# Patient Record
Sex: FEMALE | Race: WHITE | NOT HISPANIC OR LATINO
[De-identification: names, ages, dates, MRNs, and addresses within clinical notes are randomized per-mention and may not be internally consistent; named-entity substitution may affect disease eponyms.]

---

## 2017-09-29 ENCOUNTER — RECORD ABSTRACTING (OUTPATIENT)
Age: 47
End: 2017-09-29

## 2017-10-03 ENCOUNTER — NON-APPOINTMENT (OUTPATIENT)
Age: 47
End: 2017-10-03

## 2017-10-03 ENCOUNTER — APPOINTMENT (OUTPATIENT)
Dept: FAMILY MEDICINE | Facility: CLINIC | Age: 47
End: 2017-10-03
Payer: COMMERCIAL

## 2017-10-03 VITALS
HEIGHT: 67 IN | SYSTOLIC BLOOD PRESSURE: 96 MMHG | BODY MASS INDEX: 21.19 KG/M2 | OXYGEN SATURATION: 91 % | RESPIRATION RATE: 14 BRPM | HEART RATE: 81 BPM | WEIGHT: 135 LBS | DIASTOLIC BLOOD PRESSURE: 64 MMHG

## 2017-10-03 DIAGNOSIS — Z80.0 FAMILY HISTORY OF MALIGNANT NEOPLASM OF DIGESTIVE ORGANS: ICD-10-CM

## 2017-10-03 DIAGNOSIS — Z87.09 PERSONAL HISTORY OF OTHER DISEASES OF THE RESPIRATORY SYSTEM: ICD-10-CM

## 2017-10-03 DIAGNOSIS — R93.2 ABNORMAL FINDINGS ON DIAGNOSTIC IMAGING OF LIVER AND BILIARY TRACT: ICD-10-CM

## 2017-10-03 LAB
BILIRUB UR QL STRIP: NORMAL
GLUCOSE UR-MCNC: NORMAL
HCG UR QL: 0.2 EU/DL
HGB UR QL STRIP.AUTO: NORMAL
KETONES UR-MCNC: NORMAL
LEUKOCYTE ESTERASE UR QL STRIP: NORMAL
NITRITE UR QL STRIP: NORMAL
PH UR STRIP: 5
PROT UR STRIP-MCNC: NORMAL
SP GR UR STRIP: 1

## 2017-10-03 PROCEDURE — 99396 PREV VISIT EST AGE 40-64: CPT | Mod: 25

## 2017-10-03 PROCEDURE — G0008: CPT

## 2017-10-03 PROCEDURE — 81002 URINALYSIS NONAUTO W/O SCOPE: CPT

## 2017-10-03 PROCEDURE — 93000 ELECTROCARDIOGRAM COMPLETE: CPT

## 2017-10-03 PROCEDURE — 90686 IIV4 VACC NO PRSV 0.5 ML IM: CPT

## 2017-10-03 RX ORDER — ALPRAZOLAM 0.5 MG/1
0.5 TABLET ORAL 4 TIMES DAILY
Refills: 0 | Status: DISCONTINUED | COMMUNITY
End: 2017-10-03

## 2017-10-08 LAB
ALBUMIN SERPL ELPH-MCNC: 4.5 G/DL
ALP BLD-CCNC: 42 U/L
ALT SERPL-CCNC: 11 U/L
ANION GAP SERPL CALC-SCNC: 14 MMOL/L
AST SERPL-CCNC: 21 U/L
BASOPHILS # BLD AUTO: 0.03 K/UL
BASOPHILS NFR BLD AUTO: 0.6 %
BILIRUB SERPL-MCNC: 0.4 MG/DL
BUN SERPL-MCNC: 15 MG/DL
CALCIUM SERPL-MCNC: 9.5 MG/DL
CHLORIDE SERPL-SCNC: 101 MMOL/L
CHOLEST SERPL-MCNC: 178 MG/DL
CHOLEST/HDLC SERPL: 2.3 RATIO
CO2 SERPL-SCNC: 26 MMOL/L
CREAT SERPL-MCNC: 0.78 MG/DL
EOSINOPHIL # BLD AUTO: 0.13 K/UL
EOSINOPHIL NFR BLD AUTO: 2.8 %
ESTIMATED AVERAGE GLUCOSE: 111 MG/DL
ESTROGEN SERPL-MCNC: 242 PG/ML
FSH SERPL-MCNC: 55.1 IU/L
GLUCOSE SERPL-MCNC: 91 MG/DL
HBA1C MFR BLD HPLC: 5.5 %
HCT VFR BLD CALC: 39.1 %
HDLC SERPL-MCNC: 76 MG/DL
HGB BLD-MCNC: 13.1 G/DL
IMM GRANULOCYTES NFR BLD AUTO: 0.9 %
IRON SERPL-MCNC: 166 UG/DL
LDLC SERPL CALC-MCNC: 90 MG/DL
LH SERPL-ACNC: 37.7 IU/L
LYMPHOCYTES # BLD AUTO: 2.16 K/UL
LYMPHOCYTES NFR BLD AUTO: 46.1 %
MAN DIFF?: NORMAL
MCHC RBC-ENTMCNC: 31.6 PG
MCHC RBC-ENTMCNC: 33.5 GM/DL
MCV RBC AUTO: 94.2 FL
MONOCYTES # BLD AUTO: 0.56 K/UL
MONOCYTES NFR BLD AUTO: 11.9 %
NEUTROPHILS # BLD AUTO: 1.77 K/UL
NEUTROPHILS NFR BLD AUTO: 37.7 %
PLATELET # BLD AUTO: 257 K/UL
POTASSIUM SERPL-SCNC: 4.6 MMOL/L
PROT SERPL-MCNC: 7.5 G/DL
RBC # BLD: 4.15 M/UL
RBC # FLD: 12.6 %
SODIUM SERPL-SCNC: 141 MMOL/L
TRIGL SERPL-MCNC: 62 MG/DL
TSH SERPL-ACNC: 1.6 UIU/ML
WBC # FLD AUTO: 4.69 K/UL

## 2017-12-01 ENCOUNTER — LABORATORY RESULT (OUTPATIENT)
Age: 47
End: 2017-12-01

## 2018-07-22 PROBLEM — Z80.0 FAMILY HISTORY OF COLON CANCER: Status: ACTIVE | Noted: 2017-10-03

## 2018-10-26 ENCOUNTER — APPOINTMENT (OUTPATIENT)
Dept: FAMILY MEDICINE | Facility: CLINIC | Age: 48
End: 2018-10-26
Payer: COMMERCIAL

## 2018-10-26 ENCOUNTER — NON-APPOINTMENT (OUTPATIENT)
Age: 48
End: 2018-10-26

## 2018-10-26 ENCOUNTER — LABORATORY RESULT (OUTPATIENT)
Age: 48
End: 2018-10-26

## 2018-10-26 VITALS
SYSTOLIC BLOOD PRESSURE: 100 MMHG | OXYGEN SATURATION: 98 % | HEIGHT: 67 IN | BODY MASS INDEX: 21.19 KG/M2 | DIASTOLIC BLOOD PRESSURE: 60 MMHG | HEART RATE: 78 BPM | WEIGHT: 135 LBS | RESPIRATION RATE: 14 BRPM

## 2018-10-26 DIAGNOSIS — Z12.31 ENCOUNTER FOR SCREENING MAMMOGRAM FOR MALIGNANT NEOPLASM OF BREAST: ICD-10-CM

## 2018-10-26 DIAGNOSIS — D72.829 ELEVATED WHITE BLOOD CELL COUNT, UNSPECIFIED: ICD-10-CM

## 2018-10-26 LAB
BILIRUB UR QL STRIP: NORMAL
GLUCOSE UR-MCNC: NORMAL
HCG UR QL: 0.2 EU/DL
HGB UR QL STRIP.AUTO: NORMAL
KETONES UR-MCNC: NORMAL
LEUKOCYTE ESTERASE UR QL STRIP: NORMAL
NITRITE UR QL STRIP: NORMAL
PH UR STRIP: 7
PROT UR STRIP-MCNC: NORMAL
SP GR UR STRIP: 1.02

## 2018-10-26 PROCEDURE — 93000 ELECTROCARDIOGRAM COMPLETE: CPT

## 2018-10-26 PROCEDURE — G0008: CPT

## 2018-10-26 PROCEDURE — 36415 COLL VENOUS BLD VENIPUNCTURE: CPT

## 2018-10-26 PROCEDURE — 90686 IIV4 VACC NO PRSV 0.5 ML IM: CPT

## 2018-10-26 PROCEDURE — 99396 PREV VISIT EST AGE 40-64: CPT | Mod: 25

## 2018-10-26 PROCEDURE — 81003 URINALYSIS AUTO W/O SCOPE: CPT | Mod: NC,QW

## 2018-10-26 NOTE — ASSESSMENT
[FreeTextEntry1] : Basic cardiovascular prevention measures are advised including regular exercise, surveillance medical examination, and prudent portion-controlled low fat diet, rich in a variety of vegetables with minimal added sugars, refined starches, and no artificially hydrogenated oils.\par \par  cont medications- lexapro renew xanax.  As per usual protocol the patient was advised in regards to the risks of driving when on medications with side effects of dizziness or drowsiness. Patient has been assessed  for increase risk for respiratory depression. Istop checked.\par \par obtain mammogram obtain colonoscopy\par \par  Information regarding flu shot reviewed. All questions answered.Flu shot .5 cc IM    left  deltoid . No reaction noted. Advised patients to wash hands to reduce the spread of infection.\par \par Labs drawn/ specimens obtained  in office on this date of service  for evaluation of   assessed conditions -  screening diabetes, cholesterol thyroid iron    to be run at [facility].\par

## 2018-10-26 NOTE — HEALTH RISK ASSESSMENT
[No falls in past year] : Patient reported no falls in the past year [0] : 2) Feeling down, depressed, or hopeless: Not at all (0) [Patient declined bone density test] : Patient declined bone density test [HIV test declined] : HIV test declined [Hepatitis C test declined] : Hepatitis C test declined [None] : None [With Family] : lives with family [] :  [# Of Children ___] : has [unfilled] children [Sexually Active] : sexually active [Feels Safe at Home] : Feels safe at home [Fully functional (bathing, dressing, toileting, transferring, walking, feeding)] : Fully functional (bathing, dressing, toileting, transferring, walking, feeding) [Fully functional (using the telephone, shopping, preparing meals, housekeeping, doing laundry, using] : Fully functional and needs no help or supervision to perform IADLs (using the telephone, shopping, preparing meals, housekeeping, doing laundry, using transportation, managing medications and managing finances) [Smoke Detector] : smoke detector [Carbon Monoxide Detector] : carbon monoxide detector [Safety elements used in home] : safety elements used in home [Seat Belt] :  uses seat belt [Sunscreen] : uses sunscreen [Discussed at today's visit] : Advance Directives Discussed at today's visit [Designated Healthcare Proxy] : Designated healthcare proxy [Relationship: ___] : Relationship: [unfilled] [Aggressive treatment] : aggressive treatment [Excellent] : ~his/her~  mood as  excellent [Patient reported colonoscopy was normal] : Patient reported colonoscopy was normal [# of Members in Household ___] :  household currently consist of [unfilled] member(s) [Employed] : employed [High School] : high school [Reports changes in hearing] : Reports changes in hearing [Name: ___] : Health Care Proxy's Name: [unfilled]  [FreeTextEntry1] : physical [] : No [de-identified] : no [de-identified] : gyn Dr Roman [Change in mental status noted] : No change in mental status noted [Language] : denies difficulty with language [Behavior] : denies difficulty with behavior [Learning/Retaining New Information] : denies difficulty learning/retaining new information [Handling Complex Tasks] : denies difficulty handling complex tasks [Reasoning] : denies difficulty with reasoning [Spatial Ability and Orientation] : denies difficulty with spatial ability and orientation [Reports changes in vision] : Reports no changes in vision [Reports changes in dental health] : Reports no changes in dental health [Guns at Home] : no guns at home [Travel to Developing Areas] : does not  travel to developing areas [MammogramDate] : 2/18 [ColonoscopyDate] : 2015

## 2018-10-26 NOTE — HISTORY OF PRESENT ILLNESS
[FreeTextEntry1] : patient presents for physical  [de-identified] : 48 wf here for physical patient weaned herself off xanax and takes it very sparingly. she is feeling good on the lexapro. She doesn’t have any palpitations. The meds keep her even.

## 2018-10-29 LAB
24R-OH-CALCIDIOL SERPL-MCNC: 74.1 PG/ML
ALBUMIN SERPL ELPH-MCNC: 4.3 G/DL
ALP BLD-CCNC: 45 U/L
ALT SERPL-CCNC: 14 U/L
ANION GAP SERPL CALC-SCNC: 11 MMOL/L
AST SERPL-CCNC: 21 U/L
BACTERIA UR CULT: NORMAL
BASOPHILS # BLD AUTO: 0.04 K/UL
BASOPHILS NFR BLD AUTO: 0.6 %
BILIRUB SERPL-MCNC: 0.2 MG/DL
BUN SERPL-MCNC: 11 MG/DL
CALCIUM SERPL-MCNC: 8.5 MG/DL
CHLORIDE SERPL-SCNC: 104 MMOL/L
CHOLEST SERPL-MCNC: 173 MG/DL
CHOLEST/HDLC SERPL: 2.4 RATIO
CO2 SERPL-SCNC: 26 MMOL/L
CREAT SERPL-MCNC: 0.61 MG/DL
EOSINOPHIL # BLD AUTO: 0.1 K/UL
EOSINOPHIL NFR BLD AUTO: 1.5 %
FOLATE SERPL-MCNC: 9.5 NG/ML
GLUCOSE SERPL-MCNC: 86 MG/DL
HBA1C MFR BLD HPLC: 5.5 %
HCT VFR BLD CALC: 36.6 %
HDLC SERPL-MCNC: 71 MG/DL
HGB BLD-MCNC: 12.4 G/DL
IMM GRANULOCYTES NFR BLD AUTO: 0 %
IRON SERPL-MCNC: 88 UG/DL
LDLC SERPL CALC-MCNC: 78 MG/DL
LYMPHOCYTES # BLD AUTO: 2.08 K/UL
LYMPHOCYTES NFR BLD AUTO: 31.6 %
MAGNESIUM SERPL-MCNC: 2 MG/DL
MAN DIFF?: NORMAL
MCHC RBC-ENTMCNC: 31.4 PG
MCHC RBC-ENTMCNC: 33.9 GM/DL
MCV RBC AUTO: 92.7 FL
MONOCYTES # BLD AUTO: 0.69 K/UL
MONOCYTES NFR BLD AUTO: 10.5 %
NEUTROPHILS # BLD AUTO: 3.67 K/UL
NEUTROPHILS NFR BLD AUTO: 55.8 %
PLATELET # BLD AUTO: 325 K/UL
POTASSIUM SERPL-SCNC: 4.2 MMOL/L
PROT SERPL-MCNC: 6.9 G/DL
RBC # BLD: 3.95 M/UL
RBC # FLD: 13.1 %
SODIUM SERPL-SCNC: 141 MMOL/L
T3 SERPL-MCNC: 101 NG/DL
T3FREE SERPL-MCNC: 2.44 PG/ML
T3RU NFR SERPL: 1.1 INDEX
T4 FREE SERPL-MCNC: 1.3 NG/DL
TRIGL SERPL-MCNC: 119 MG/DL
TSH SERPL-ACNC: 0.9 UIU/ML
VIT B12 SERPL-MCNC: 954 PG/ML
WBC # FLD AUTO: 6.58 K/UL

## 2018-11-13 LAB — HEMOCCULT STL QL IA: NEGATIVE

## 2018-12-03 ENCOUNTER — MEDICATION RENEWAL (OUTPATIENT)
Age: 48
End: 2018-12-03

## 2018-12-03 DIAGNOSIS — B00.1 HERPESVIRAL VESICULAR DERMATITIS: ICD-10-CM

## 2019-01-21 ENCOUNTER — RESULT REVIEW (OUTPATIENT)
Age: 49
End: 2019-01-21

## 2019-08-01 ENCOUNTER — RX RENEWAL (OUTPATIENT)
Age: 49
End: 2019-08-01

## 2019-11-01 ENCOUNTER — APPOINTMENT (OUTPATIENT)
Dept: FAMILY MEDICINE | Facility: CLINIC | Age: 49
End: 2019-11-01
Payer: COMMERCIAL

## 2019-11-01 ENCOUNTER — NON-APPOINTMENT (OUTPATIENT)
Age: 49
End: 2019-11-01

## 2019-11-01 ENCOUNTER — LABORATORY RESULT (OUTPATIENT)
Age: 49
End: 2019-11-01

## 2019-11-01 VITALS
WEIGHT: 150 LBS | SYSTOLIC BLOOD PRESSURE: 90 MMHG | RESPIRATION RATE: 14 BRPM | OXYGEN SATURATION: 99 % | HEART RATE: 70 BPM | BODY MASS INDEX: 23.54 KG/M2 | DIASTOLIC BLOOD PRESSURE: 70 MMHG | HEIGHT: 67 IN

## 2019-11-01 DIAGNOSIS — H91.91 UNSPECIFIED HEARING LOSS, RIGHT EAR: ICD-10-CM

## 2019-11-01 DIAGNOSIS — R94.31 ABNORMAL ELECTROCARDIOGRAM [ECG] [EKG]: ICD-10-CM

## 2019-11-01 LAB
BILIRUB UR QL STRIP: NEGATIVE
CLARITY UR: CLEAR
COLLECTION METHOD: NORMAL
CYTOLOGY CVX/VAG DOC THIN PREP: NORMAL
GLUCOSE UR-MCNC: NEGATIVE
HCG UR QL: 0.2 EU/DL
HGB UR QL STRIP.AUTO: NORMAL
KETONES UR-MCNC: NEGATIVE
LEUKOCYTE ESTERASE UR QL STRIP: NORMAL
NITRITE UR QL STRIP: NEGATIVE
PH UR STRIP: 6
PROT UR STRIP-MCNC: NEGATIVE
SP GR UR STRIP: 1.02

## 2019-11-01 PROCEDURE — G0008: CPT

## 2019-11-01 PROCEDURE — 90674 CCIIV4 VAC NO PRSV 0.5 ML IM: CPT

## 2019-11-01 PROCEDURE — 99396 PREV VISIT EST AGE 40-64: CPT | Mod: 25

## 2019-11-01 PROCEDURE — 81003 URINALYSIS AUTO W/O SCOPE: CPT | Mod: NC,QW

## 2019-11-01 PROCEDURE — 36415 COLL VENOUS BLD VENIPUNCTURE: CPT

## 2019-11-01 PROCEDURE — 93000 ELECTROCARDIOGRAM COMPLETE: CPT

## 2019-11-01 NOTE — PHYSICAL EXAM
[No Acute Distress] : no acute distress [Well Nourished] : well nourished [Well Developed] : well developed [Well-Appearing] : well-appearing [Normal Sclera/Conjunctiva] : normal sclera/conjunctiva [PERRL] : pupils equal round and reactive to light [EOMI] : extraocular movements intact [Normal Outer Ear/Nose] : the outer ears and nose were normal in appearance [Normal Oropharynx] : the oropharynx was normal [No JVD] : no jugular venous distention [No Lymphadenopathy] : no lymphadenopathy [Supple] : supple [Thyroid Normal, No Nodules] : the thyroid was normal and there were no nodules present [No Respiratory Distress] : no respiratory distress  [No Accessory Muscle Use] : no accessory muscle use [Clear to Auscultation] : lungs were clear to auscultation bilaterally [Normal Rate] : normal rate  [Regular Rhythm] : with a regular rhythm [Normal S1, S2] : normal S1 and S2 [No Murmur] : no murmur heard [No Carotid Bruits] : no carotid bruits [No Abdominal Bruit] : a ~M bruit was not heard ~T in the abdomen [No Varicosities] : no varicosities [Pedal Pulses Present] : the pedal pulses are present [No Edema] : there was no peripheral edema [No Palpable Aorta] : no palpable aorta [No Extremity Clubbing/Cyanosis] : no extremity clubbing/cyanosis [Soft] : abdomen soft [Non Tender] : non-tender [Non-distended] : non-distended [No Masses] : no abdominal mass palpated [No HSM] : no HSM [Normal Bowel Sounds] : normal bowel sounds [Normal Posterior Cervical Nodes] : no posterior cervical lymphadenopathy [Normal Anterior Cervical Nodes] : no anterior cervical lymphadenopathy [No CVA Tenderness] : no CVA  tenderness [No Spinal Tenderness] : no spinal tenderness [No Joint Swelling] : no joint swelling [Grossly Normal Strength/Tone] : grossly normal strength/tone [Coordination Grossly Intact] : coordination grossly intact [No Focal Deficits] : no focal deficits [Normal Gait] : normal gait [Deep Tendon Reflexes (DTR)] : deep tendon reflexes were 2+ and symmetric [Normal Affect] : the affect was normal [Normal Insight/Judgement] : insight and judgment were intact [Normal] : affect was normal and insight and judgment were intact [de-identified] : dark scars on legs from mosquito bites

## 2019-11-01 NOTE — ASSESSMENT
[FreeTextEntry1] : Basic cardiovascular prevention measures are advised including regular exercise, surveillance medical examination, and prudent portion-controlled low fat diet, rich in a variety of vegetables with minimal added sugars, refined starches, and no artificially hydrogenated oils.\par Labs drawn/ specimens obtained  in office on this date of service  for evaluation of   assessed conditions -  cbc cmp lipid    to be run at [facility].\par  Information regarding flu shot reviewed. All questions answered.Flu shot .5 cc IM  left    deltoid . No reaction noted. Advised patients to wash hands to reduce the spread of infection.\par echocardiogram to confirm ekg abnormality.  ( Ekg  no changes\par colonoscopy at 50 \par follow up dermatology\par renew meds.\par  As per usual protocol the patient was advised in regards to the risks of driving when on medications with side effects of dizziness or drowsiness. Patient has been assessed  for increase risk for respiratory depression. Patient denies suicidal ideations or plan.  Istop checked.\par  We spent sufficient time to discuss aspects of care; questions were answered  to patient's satisfaction.The diagnosis and care plan were discussed with patient in detail.  Patient test results were  reviewed and explained in full. All questions and concerns  were answered to the best of my knowledge.\par \par

## 2019-11-01 NOTE — REVIEW OF SYSTEMS
[Hearing Loss] : hearing loss [Fatigue] : no fatigue [Negative] : Psychiatric [FreeTextEntry4] : right  [de-identified] : scars from mosquito bites

## 2019-11-01 NOTE — HEALTH RISK ASSESSMENT
[No] : In the past 12 months have you used drugs other than those required for medical reasons? No [Patient declined Low Dose CT Scan] : Patient declined Low Dose CT Scan [Patient reported mammogram was normal] : Patient reported mammogram was normal [Patient declined bone density test] : Patient declined bone density test [Patient reported colonoscopy was normal] : Patient reported colonoscopy was normal [HIV test declined] : HIV test declined [Hepatitis C test declined] : Hepatitis C test declined [None] : None [With Family] : lives with family [# of Members in Household ___] :  household currently consist of [unfilled] member(s) [Employed] : employed [High School] : high school [] :  [# Of Children ___] : has [unfilled] children [Sexually Active] : sexually active [Feels Safe at Home] : Feels safe at home [Fully functional (bathing, dressing, toileting, transferring, walking, feeding)] : Fully functional (bathing, dressing, toileting, transferring, walking, feeding) [Fully functional (using the telephone, shopping, preparing meals, housekeeping, doing laundry, using] : Fully functional and needs no help or supervision to perform IADLs (using the telephone, shopping, preparing meals, housekeeping, doing laundry, using transportation, managing medications and managing finances) [Reports normal functional visual acuity (ie: able to read med bottle)] : Reports normal functional visual acuity [Smoke Detector] : smoke detector [Carbon Monoxide Detector] : carbon monoxide detector [Safety elements used in home] : safety elements used in home [Seat Belt] :  uses seat belt [Sunscreen] : uses sunscreen [Reviewed updated] : Reviewed updated [Designated Healthcare Proxy] : Designated healthcare proxy [Name: ___] : Health Care Proxy's Name: [unfilled]  [Aggressive treatment] : aggressive treatment [Excellent] : ~his/her~ current health as excellent [Very Good] : ~his/her~  mood as very good [No falls in past year] : Patient reported no falls in the past year [No Retinopathy] : No retinopathy [Reports changes in hearing] : Reports changes in hearing [FreeTextEntry1] : physical [] : No [de-identified] : no [de-identified] : gyn  hearing specialist sachin delaney [Audit-CScore] : 0 [de-identified] : walk [de-identified] : good [EyeExamDate] : 3/19 [Change in mental status noted] : No change in mental status noted [Language] : denies difficulty with language [Behavior] : denies difficulty with behavior [Learning/Retaining New Information] : denies difficulty learning/retaining new information [Handling Complex Tasks] : denies difficulty handling complex tasks [Reasoning] : denies difficulty with reasoning [Spatial Ability and Orientation] : denies difficulty with spatial ability and orientation [Reports changes in vision] : Reports no changes in vision [Reports changes in dental health] : Reports no changes in dental health [Guns at Home] : no guns at home [Travel to Developing Areas] : does not  travel to developing areas [TB Exposure] : is not being exposed to tuberculosis [Caregiver Concerns] : does not have caregiver concerns [MammogramDate] : 5/18 [PapSmearDate] : 1/18 [PapSmearComments] : no menses since 12/18 [ColonoscopyDate] : 10/22/15 [de-identified] : right ear borderline needs a hearing aid. [AdvancecareDate] : 11/1/19

## 2019-11-04 LAB
ALBUMIN SERPL ELPH-MCNC: 4.4 G/DL
ALP BLD-CCNC: 57 U/L
ALT SERPL-CCNC: 12 U/L
ANION GAP SERPL CALC-SCNC: 10 MMOL/L
AST SERPL-CCNC: 16 U/L
BASOPHILS # BLD AUTO: 0.05 K/UL
BASOPHILS NFR BLD AUTO: 0.8 %
BILIRUB SERPL-MCNC: 0.4 MG/DL
BUN SERPL-MCNC: 15 MG/DL
CALCIUM SERPL-MCNC: 9.6 MG/DL
CHLORIDE SERPL-SCNC: 103 MMOL/L
CHOLEST SERPL-MCNC: 177 MG/DL
CHOLEST/HDLC SERPL: 2.5 RATIO
CO2 SERPL-SCNC: 27 MMOL/L
CREAT SERPL-MCNC: 0.7 MG/DL
EOSINOPHIL # BLD AUTO: 0.22 K/UL
EOSINOPHIL NFR BLD AUTO: 3.7 %
ESTIMATED AVERAGE GLUCOSE: 114 MG/DL
FERRITIN SERPL-MCNC: 72 NG/ML
FOLATE SERPL-MCNC: 14.8 NG/ML
GLUCOSE SERPL-MCNC: 94 MG/DL
HBA1C MFR BLD HPLC: 5.6 %
HCT VFR BLD CALC: 38 %
HDLC SERPL-MCNC: 70 MG/DL
HGB BLD-MCNC: 12.5 G/DL
IMM GRANULOCYTES NFR BLD AUTO: 0.2 %
IRON SATN MFR SERPL: 52 %
IRON SERPL-MCNC: 144 UG/DL
LDLC SERPL CALC-MCNC: 91 MG/DL
LYMPHOCYTES # BLD AUTO: 2.58 K/UL
LYMPHOCYTES NFR BLD AUTO: 43.7 %
MAGNESIUM SERPL-MCNC: 1.8 MG/DL
MAN DIFF?: NORMAL
MCHC RBC-ENTMCNC: 31.3 PG
MCHC RBC-ENTMCNC: 32.9 GM/DL
MCV RBC AUTO: 95 FL
MONOCYTES # BLD AUTO: 0.63 K/UL
MONOCYTES NFR BLD AUTO: 10.7 %
NEUTROPHILS # BLD AUTO: 2.42 K/UL
NEUTROPHILS NFR BLD AUTO: 40.9 %
PLATELET # BLD AUTO: 284 K/UL
POTASSIUM SERPL-SCNC: 4 MMOL/L
PROT SERPL-MCNC: 6.6 G/DL
RBC # BLD: 4 M/UL
RBC # FLD: 12.2 %
SODIUM SERPL-SCNC: 140 MMOL/L
T3 SERPL-MCNC: 120 NG/DL
T3FREE SERPL-MCNC: 3.7 PG/ML
T3RU NFR SERPL: 1 TBI
T4 FREE SERPL-MCNC: 1.5 NG/DL
TIBC SERPL-MCNC: 278 UG/DL
TRIGL SERPL-MCNC: 79 MG/DL
TSH SERPL-ACNC: 1.33 UIU/ML
UIBC SERPL-MCNC: 134 UG/DL
VIT B12 SERPL-MCNC: 860 PG/ML
WBC # FLD AUTO: 5.91 K/UL

## 2020-11-06 ENCOUNTER — APPOINTMENT (OUTPATIENT)
Dept: FAMILY MEDICINE | Facility: CLINIC | Age: 50
End: 2020-11-06
Payer: COMMERCIAL

## 2020-11-06 ENCOUNTER — NON-APPOINTMENT (OUTPATIENT)
Age: 50
End: 2020-11-06

## 2020-11-06 VITALS
BODY MASS INDEX: 23.54 KG/M2 | HEIGHT: 67 IN | SYSTOLIC BLOOD PRESSURE: 110 MMHG | WEIGHT: 150 LBS | OXYGEN SATURATION: 98 % | TEMPERATURE: 97.6 F | DIASTOLIC BLOOD PRESSURE: 80 MMHG | RESPIRATION RATE: 14 BRPM | HEART RATE: 74 BPM

## 2020-11-06 DIAGNOSIS — R20.2 PARESTHESIA OF SKIN: ICD-10-CM

## 2020-11-06 LAB
BILIRUB UR QL STRIP: NEGATIVE
GLUCOSE UR-MCNC: NEGATIVE
HCG UR QL: 0.2 EU/DL
HGB UR QL STRIP.AUTO: NORMAL
KETONES UR-MCNC: NEGATIVE
LEUKOCYTE ESTERASE UR QL STRIP: NORMAL
NITRITE UR QL STRIP: NEGATIVE
PH UR STRIP: 6.5
PROT UR STRIP-MCNC: NEGATIVE
SP GR UR STRIP: 1.02

## 2020-11-06 PROCEDURE — 36415 COLL VENOUS BLD VENIPUNCTURE: CPT

## 2020-11-06 PROCEDURE — 93000 ELECTROCARDIOGRAM COMPLETE: CPT

## 2020-11-06 PROCEDURE — G0008: CPT

## 2020-11-06 PROCEDURE — 99072 ADDL SUPL MATRL&STAF TM PHE: CPT

## 2020-11-06 PROCEDURE — 81003 URINALYSIS AUTO W/O SCOPE: CPT | Mod: QW

## 2020-11-06 PROCEDURE — 90686 IIV4 VACC NO PRSV 0.5 ML IM: CPT

## 2020-11-06 PROCEDURE — 99396 PREV VISIT EST AGE 40-64: CPT | Mod: 25

## 2020-11-06 RX ORDER — VALACYCLOVIR 500 MG/1
500 TABLET, FILM COATED ORAL
Qty: 90 | Refills: 3 | Status: ACTIVE | COMMUNITY
Start: 2018-12-03 | End: 1900-01-01

## 2020-11-06 NOTE — HEALTH RISK ASSESSMENT
[No] : In the past 12 months have you used drugs other than those required for medical reasons? No [No falls in past year] : Patient reported no falls in the past year [Patient declined Low Dose CT Scan] : Patient declined Low Dose CT Scan [No Retinopathy] : No retinopathy [Patient reported mammogram was normal] : Patient reported mammogram was normal [Patient declined bone density test] : Patient declined bone density test [Patient reported colonoscopy was normal] : Patient reported colonoscopy was normal [HIV test declined] : HIV test declined [Hepatitis C test declined] : Hepatitis C test declined [None] : None [With Family] : lives with family [# of Members in Household ___] :  household currently consist of [unfilled] member(s) [Employed] : employed [High School] : high school [] :  [# Of Children ___] : has [unfilled] children [Sexually Active] : sexually active [Feels Safe at Home] : Feels safe at home [Fully functional (bathing, dressing, toileting, transferring, walking, feeding)] : Fully functional (bathing, dressing, toileting, transferring, walking, feeding) [Fully functional (using the telephone, shopping, preparing meals, housekeeping, doing laundry, using] : Fully functional and needs no help or supervision to perform IADLs (using the telephone, shopping, preparing meals, housekeeping, doing laundry, using transportation, managing medications and managing finances) [Reports changes in hearing] : Reports changes in hearing [Reports normal functional visual acuity (ie: able to read med bottle)] : Reports normal functional visual acuity [Smoke Detector] : smoke detector [Carbon Monoxide Detector] : carbon monoxide detector [Safety elements used in home] : safety elements used in home [Seat Belt] :  uses seat belt [Sunscreen] : uses sunscreen [Reviewed updated] : Reviewed updated [Designated Healthcare Proxy] : Designated healthcare proxy [Name: ___] : Health Care Proxy's Name: [unfilled]  [Aggressive treatment] : aggressive treatment [Good] : ~his/her~ current health as good [Fair] :  ~his/her~ mood as fair [FreeTextEntry1] : physical [] : No [de-identified] : no [de-identified] : gyn  hearing specialist sachin delaney [Audit-CScore] : 0 [de-identified] : no [de-identified] : no [EyeExamDate] : 3/19 [Change in mental status noted] : No change in mental status noted [Language] : denies difficulty with language [Behavior] : denies difficulty with behavior [Learning/Retaining New Information] : denies difficulty learning/retaining new information [Handling Complex Tasks] : denies difficulty handling complex tasks [Reasoning] : denies difficulty with reasoning [Spatial Ability and Orientation] : denies difficulty with spatial ability and orientation [Reports changes in vision] : Reports no changes in vision [Reports changes in dental health] : Reports no changes in dental health [Guns at Home] : no guns at home [Travel to Developing Areas] : does not  travel to developing areas [TB Exposure] : is not being exposed to tuberculosis [Caregiver Concerns] : does not have caregiver concerns [MammogramDate] : 5/20 [PapSmearDate] : 3/20 [PapSmearComments] : no menses since 12/18 [ColonoscopyDate] : 10/22/15 [ColonoscopyComments] : due [de-identified] : right ear borderline needs a hearing aid. [AdvancecareDate] : 11/5/20

## 2020-11-06 NOTE — PHYSICAL EXAM
[No Acute Distress] : no acute distress [Well Nourished] : well nourished [Well Developed] : well developed [Well-Appearing] : well-appearing [Normal Sclera/Conjunctiva] : normal sclera/conjunctiva [PERRL] : pupils equal round and reactive to light [EOMI] : extraocular movements intact [Normal Outer Ear/Nose] : the outer ears and nose were normal in appearance [Normal Oropharynx] : the oropharynx was normal [No JVD] : no jugular venous distention [No Lymphadenopathy] : no lymphadenopathy [Supple] : supple [Thyroid Normal, No Nodules] : the thyroid was normal and there were no nodules present [No Respiratory Distress] : no respiratory distress  [No Accessory Muscle Use] : no accessory muscle use [Clear to Auscultation] : lungs were clear to auscultation bilaterally [Normal Rate] : normal rate  [Regular Rhythm] : with a regular rhythm [Normal S1, S2] : normal S1 and S2 [No Murmur] : no murmur heard [No Carotid Bruits] : no carotid bruits [No Abdominal Bruit] : a ~M bruit was not heard ~T in the abdomen [No Varicosities] : no varicosities [Pedal Pulses Present] : the pedal pulses are present [No Edema] : there was no peripheral edema [No Palpable Aorta] : no palpable aorta [No Extremity Clubbing/Cyanosis] : no extremity clubbing/cyanosis [Soft] : abdomen soft [Non Tender] : non-tender [Non-distended] : non-distended [No Masses] : no abdominal mass palpated [No HSM] : no HSM [Normal Bowel Sounds] : normal bowel sounds [Normal Posterior Cervical Nodes] : no posterior cervical lymphadenopathy [Normal Anterior Cervical Nodes] : no anterior cervical lymphadenopathy [No CVA Tenderness] : no CVA  tenderness [No Spinal Tenderness] : no spinal tenderness [Coordination Grossly Intact] : coordination grossly intact [No Focal Deficits] : no focal deficits [Normal Gait] : normal gait [Deep Tendon Reflexes (DTR)] : deep tendon reflexes were 2+ and symmetric [Normal Affect] : the affect was normal [Normal Insight/Judgement] : insight and judgment were intact [Normal] : affect was normal and insight and judgment were intact [de-identified] : no atrophy of muscle. patient asymptomatic now [de-identified] : dark scars on legs from mosquito bites

## 2020-11-06 NOTE — REVIEW OF SYSTEMS
[Hearing Loss] : hearing loss [Negative] : Psychiatric [Fatigue] : no fatigue [Joint Stiffness] : joint stiffness [Back Pain] : back pain [FreeTextEntry4] : right  [FreeTextEntry9] : right leg pain left foot numbness

## 2020-11-06 NOTE — HISTORY OF PRESENT ILLNESS
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

## 2020-11-06 NOTE — ASSESSMENT
[FreeTextEntry1] : Basic cardiovascular prevention measures are advised including regular exercise, surveillance medical examination, and prudent portion-controlled low fat diet, rich in a variety of vegetables with minimal added sugars, refined starches, and no artificially hydrogenated oils.\par Labs drawn/ specimens obtained  in office on this date of service  for evaluation of   assessed conditions -  cbc cmp lipid    to be run at [facility].\par  Information regarding flu shot reviewed. All questions answered.Flu shot .5 cc IM  left    deltoid . No reaction noted. Advised patients to wash hands to reduce the spread of infection.\par  mammogram utd\par follow up neurology for notalgia paresthetica\par renew meds.\par  As per usual protocol the patient was advised in regards to the risks of driving when on medications with side effects of dizziness or drowsiness. Patient has been assessed  for increase risk for respiratory depression. Patient denies suicidal ideations or plan.  Istop checked.\par  We spent sufficient time to discuss aspects of care; questions were answered  to patient's satisfaction.The diagnosis and care plan were discussed with patient in detail.  Patient test results were  reviewed and explained in full. All questions and concerns  were answered to the best of my knowledge.\par \par

## 2020-11-08 LAB
LDLC SERPL CALC-MCNC: 111 MG/DL
NONHDLC SERPL-MCNC: 125 MG/DL

## 2020-11-09 LAB
CHOLEST SERPL-MCNC: 193 MG/DL
HDLC SERPL-MCNC: 68 MG/DL
TRIGL SERPL-MCNC: 65 MG/DL

## 2020-11-10 LAB
24R-OH-CALCIDIOL SERPL-MCNC: 57.9 PG/ML
ALBUMIN SERPL ELPH-MCNC: 4.7 G/DL
ALP BLD-CCNC: 69 U/L
ALT SERPL-CCNC: 15 U/L
ANION GAP SERPL CALC-SCNC: 11 MMOL/L
AST SERPL-CCNC: 21 U/L
BASOPHILS # BLD AUTO: 0.06 K/UL
BASOPHILS NFR BLD AUTO: 1 %
BILIRUB SERPL-MCNC: 0.4 MG/DL
BUN SERPL-MCNC: 16 MG/DL
CALCIUM SERPL-MCNC: 9.7 MG/DL
CHLORIDE SERPL-SCNC: 100 MMOL/L
CO2 SERPL-SCNC: 27 MMOL/L
CREAT SERPL-MCNC: 0.8 MG/DL
EOSINOPHIL # BLD AUTO: 0.13 K/UL
EOSINOPHIL NFR BLD AUTO: 2.3 %
ESTIMATED AVERAGE GLUCOSE: 114 MG/DL
FERRITIN SERPL-MCNC: 109 NG/ML
FOLATE SERPL-MCNC: 10.7 NG/ML
GLUCOSE SERPL-MCNC: 97 MG/DL
HBA1C MFR BLD HPLC: 5.6 %
HCT VFR BLD CALC: 39.7 %
HCV AB SER QL: NONREACTIVE
HCV S/CO RATIO: 0.21 S/CO
HGB BLD-MCNC: 12.8 G/DL
HIV1+2 AB SPEC QL IA.RAPID: NONREACTIVE
IMM GRANULOCYTES NFR BLD AUTO: 0.2 %
IRON SATN MFR SERPL: 38 %
IRON SERPL-MCNC: 121 UG/DL
LYMPHOCYTES # BLD AUTO: 2.57 K/UL
LYMPHOCYTES NFR BLD AUTO: 44.5 %
MAGNESIUM SERPL-MCNC: 1.9 MG/DL
MAN DIFF?: NORMAL
MCHC RBC-ENTMCNC: 30.2 PG
MCHC RBC-ENTMCNC: 32.2 GM/DL
MCV RBC AUTO: 93.6 FL
MONOCYTES # BLD AUTO: 0.61 K/UL
MONOCYTES NFR BLD AUTO: 10.6 %
NEUTROPHILS # BLD AUTO: 2.39 K/UL
NEUTROPHILS NFR BLD AUTO: 41.4 %
PLATELET # BLD AUTO: 299 K/UL
POTASSIUM SERPL-SCNC: 4.2 MMOL/L
PROT SERPL-MCNC: 6.6 G/DL
RBC # BLD: 4.24 M/UL
RBC # FLD: 12.3 %
SARS-COV-2 IGG SERPL IA-ACNC: <0.1 INDEX
SARS-COV-2 IGG SERPL QL IA: NEGATIVE
SODIUM SERPL-SCNC: 138 MMOL/L
T3 SERPL-MCNC: 112 NG/DL
T3FREE SERPL-MCNC: 3.07 PG/ML
T4 FREE SERPL-MCNC: 1.3 NG/DL
TIBC SERPL-MCNC: 321 UG/DL
TSH SERPL-ACNC: 0.94 UIU/ML
UIBC SERPL-MCNC: 200 UG/DL
VIT B12 SERPL-MCNC: 660 PG/ML
WBC # FLD AUTO: 5.77 K/UL

## 2021-07-15 ENCOUNTER — LABORATORY RESULT (OUTPATIENT)
Age: 51
End: 2021-07-15

## 2021-10-01 ENCOUNTER — APPOINTMENT (OUTPATIENT)
Dept: FAMILY MEDICINE | Facility: CLINIC | Age: 51
End: 2021-10-01
Payer: COMMERCIAL

## 2021-10-01 DIAGNOSIS — F39 UNSPECIFIED MOOD [AFFECTIVE] DISORDER: ICD-10-CM

## 2021-10-01 PROCEDURE — 99441: CPT

## 2021-10-01 NOTE — ASSESSMENT
[FreeTextEntry1] : Discussed the following risk reducing strategies. - Wash hands with soap and water , use alcohol based hand  when hand washing is not an option. Maintain social distancing of at least 6 feet whenever possible , avoid hand shaking, avoid touching eyes, nose and mouth, cover mouth when coughing or sneezing, avoid close contact with sick people. seek medical help if fever, or worse symptoms cough chest pain, or shortness of breath.\par   As per usual protocol the patient was advised in regards to the risks of driving when on medications with side effects of dizziness or drowsiness. Patient has been assessed  for increase risk for respiratory depression. Patient denies suicidal ideations or plan.  Istop checked.\par

## 2021-10-01 NOTE — HISTORY OF PRESENT ILLNESS
[FreeTextEntry1] : medication refill [Home] : at home, [unfilled] , at the time of the visit. [Medical Office: ([facility])___] : at the medical office located in  [Verbal consent obtained from patient] : the patient, [unfilled] [de-identified] : Patient initiated communication for concern via HIPAA secure platform  (Telemedicine )  for   anxiety                   using     phone         .Reviewed quarantine instructions and self isolation to limit spread of disease  as per [facility] guidelines.  Patient is an established patient and has verbalized consent to be treated via telemedicine .\par she is requesting xanax. she uses it to sleep it relaxes her on a stressful work day. Her  is booking more plastic surgery for himself. He is doing body shaping. He had another gastric bypass. It is very stressful with him. I don’t know any man that had 2 gastric bypasses  tummy tuck and liposuction on hips. Hair transplant

## 2021-12-09 ENCOUNTER — NON-APPOINTMENT (OUTPATIENT)
Age: 51
End: 2021-12-09

## 2021-12-11 ENCOUNTER — APPOINTMENT (OUTPATIENT)
Dept: FAMILY MEDICINE | Facility: CLINIC | Age: 51
End: 2021-12-11
Payer: COMMERCIAL

## 2021-12-11 VITALS
DIASTOLIC BLOOD PRESSURE: 72 MMHG | OXYGEN SATURATION: 97 % | SYSTOLIC BLOOD PRESSURE: 108 MMHG | HEART RATE: 66 BPM | HEIGHT: 67 IN | BODY MASS INDEX: 23.07 KG/M2 | WEIGHT: 147 LBS | RESPIRATION RATE: 14 BRPM

## 2021-12-11 DIAGNOSIS — R74.8 ABNORMAL LEVELS OF OTHER SERUM ENZYMES: ICD-10-CM
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
## 2021-12-11 LAB — CYTOLOGY CVX/VAG DOC THIN PREP: NORMAL

## 2021-12-11 PROCEDURE — 99496 TRANSJ CARE MGMT HIGH F2F 7D: CPT

## 2021-12-11 PROCEDURE — 99072 ADDL SUPL MATRL&STAF TM PHE: CPT

## 2021-12-13 PROBLEM — R74.8 ELEVATED LIVER ENZYMES: Status: ACTIVE | Noted: 2021-12-13
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
## 2021-12-13 NOTE — REVIEW OF SYSTEMS
[Negative] : Heme/Lymph [FreeTextEntry2] : generalized ecchymosis  [FreeTextEntry3] : left orbital ecchymosis  [FreeTextEntry7] : right sided sore abdominal pain radiating to back
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
## 2022-01-28 DIAGNOSIS — S09.90XA UNSPECIFIED INJURY OF HEAD, INITIAL ENCOUNTER: ICD-10-CM

## 2022-02-24 ENCOUNTER — APPOINTMENT (OUTPATIENT)
Dept: ENDOCRINOLOGY | Facility: CLINIC | Age: 52
End: 2022-02-24
Payer: COMMERCIAL

## 2022-02-24 VITALS
HEIGHT: 67 IN | HEART RATE: 96 BPM | DIASTOLIC BLOOD PRESSURE: 64 MMHG | SYSTOLIC BLOOD PRESSURE: 102 MMHG | OXYGEN SATURATION: 97 % | WEIGHT: 150 LBS | BODY MASS INDEX: 23.54 KG/M2
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
## 2022-02-24 PROCEDURE — 99205 OFFICE O/P NEW HI 60 MIN: CPT | Mod: 25

## 2022-02-24 PROCEDURE — 76536 US EXAM OF HEAD AND NECK: CPT

## 2022-02-24 RX ORDER — ESCITALOPRAM OXALATE 20 MG/1
20 TABLET ORAL
Qty: 90 | Refills: 2 | Status: DISCONTINUED | COMMUNITY
Start: 2019-08-01 | End: 2022-02-24

## 2022-02-24 RX ORDER — CHOLECALCIFEROL (VITAMIN D3) 1250 MCG
1.25 MG CAPSULE ORAL
Refills: 0 | Status: ACTIVE | COMMUNITY

## 2022-02-24 RX ORDER — MULTIVIT-MIN/IRON/FOLIC ACID/K 18-600-40
50 MCG CAPSULE ORAL DAILY
Refills: 0 | Status: DISCONTINUED | COMMUNITY
End: 2022-02-24

## 2022-02-26 ENCOUNTER — NON-APPOINTMENT (OUTPATIENT)
Age: 52
End: 2022-02-26

## 2022-02-27 NOTE — PROCEDURE
[Quirky e 2008 model, 10-12 MHz frequencies] : multiple real time longitudinal and transverse images were obtained using a high resolution ultrasound with a linear transducer, Quirky e 2008 model, 10-12 MHz frequencies. All measurements will be reported as longitudinal x vielka-posterior x transverse. [None] : None [Thyroid Nodule] : thyroid nodule [No calcification] : no calcification [Mid] : mid pole there is a  [Mixed] : mixed [Ovoid] : ovoid in shape [Echogenic Foci] : echogenic foci [No vascularity] : no vascularity [Right Thyroid] : right [Lower] : lower pole there is a  [Solid] : solid [Hypoechoic] : hypoechoic nodule [Round] : round in shape [Smooth] : smooth [Regular] : regular [No calcifications] : no calcifications [Peripheral vascularity] : peripheral vascularity [FreeTextEntry1] : 4.5x1.4x1.8 [FreeTextEntry5] : 4.1x1.2x1.3 [FreeTextEntry2] : 0.4 [FreeTextEntry4] : This nodule is adjacent to the previous complex nodule in sagittal view. [FreeTextEntry3] : 0.5x0.4x0.5

## 2022-02-27 NOTE — IMPRESSION
[FreeTextEntry1] : MNG- subcentimeter nodules, dominant nodule is complex [FreeTextEntry2] : Repeat ultrasound in 6 months

## 2022-02-28 ENCOUNTER — NON-APPOINTMENT (OUTPATIENT)
Age: 52
End: 2022-02-28

## 2022-03-23 DIAGNOSIS — N28.1 CYST OF KIDNEY, ACQUIRED: ICD-10-CM

## 2022-04-04 ENCOUNTER — APPOINTMENT (OUTPATIENT)
Dept: ENDOCRINOLOGY | Facility: CLINIC | Age: 52
End: 2022-04-04

## 2022-04-07 ENCOUNTER — APPOINTMENT (OUTPATIENT)
Dept: FAMILY MEDICINE | Facility: CLINIC | Age: 52
End: 2022-04-07

## 2022-05-03 ENCOUNTER — NON-APPOINTMENT (OUTPATIENT)
Age: 52
End: 2022-05-03

## 2022-06-23 ENCOUNTER — APPOINTMENT (OUTPATIENT)
Dept: FAMILY MEDICINE | Facility: CLINIC | Age: 52
End: 2022-06-23

## 2022-07-09 ENCOUNTER — TRANSCRIPTION ENCOUNTER (OUTPATIENT)
Age: 52
End: 2022-07-09

## 2022-07-23 ENCOUNTER — APPOINTMENT (OUTPATIENT)
Dept: FAMILY MEDICINE | Facility: CLINIC | Age: 52
End: 2022-07-23

## 2022-07-23 VITALS
WEIGHT: 140 LBS | SYSTOLIC BLOOD PRESSURE: 100 MMHG | HEART RATE: 74 BPM | DIASTOLIC BLOOD PRESSURE: 70 MMHG | RESPIRATION RATE: 14 BRPM | HEIGHT: 67 IN | BODY MASS INDEX: 21.97 KG/M2 | OXYGEN SATURATION: 99 %

## 2022-07-23 VITALS — TEMPERATURE: 98.5 F

## 2022-07-23 DIAGNOSIS — S37.011A MINOR CONTUSION OF RIGHT KIDNEY, INITIAL ENCOUNTER: ICD-10-CM

## 2022-07-23 DIAGNOSIS — F41.9 ANXIETY DISORDER, UNSPECIFIED: ICD-10-CM

## 2022-07-23 LAB — CYTOLOGY CVX/VAG DOC THIN PREP: NORMAL

## 2022-07-23 PROCEDURE — 36415 COLL VENOUS BLD VENIPUNCTURE: CPT

## 2022-07-23 PROCEDURE — 99396 PREV VISIT EST AGE 40-64: CPT | Mod: 25

## 2022-07-23 RX ORDER — AZELASTINE HYDROCHLORIDE 137 UG/1
0.1 SPRAY, METERED NASAL
Qty: 30 | Refills: 0 | Status: COMPLETED | COMMUNITY
Start: 2022-03-13

## 2022-07-23 RX ORDER — BENZONATATE 200 MG/1
200 CAPSULE ORAL
Qty: 30 | Refills: 0 | Status: COMPLETED | COMMUNITY
Start: 2022-03-13

## 2022-07-23 RX ORDER — MULTIVIT-MIN/IRON/FOLIC ACID/K 18-600-40
CAPSULE ORAL
Refills: 0 | Status: ACTIVE | COMMUNITY

## 2022-07-23 RX ORDER — SODIUM PICOSULFATE, MAGNESIUM OXIDE, AND ANHYDROUS CITRIC ACID 10; 3.5; 12 MG/160ML; G/160ML; G/160ML
10-3.5-12 MG-GM LIQUID ORAL
Qty: 320 | Refills: 0 | Status: COMPLETED | COMMUNITY
Start: 2022-07-15

## 2022-07-23 NOTE — PLAN
[FreeTextEntry1] : CPE\par will order routine lab work to be done in office and run at [facility]\par \par Reviewed endocrinology results\par due for repeat thyroid us- encouraged to follow up with Dr. Mclean in office for thyroid scan\par \par Reviewed notes from nephrologist\par resolution of hemorrhage\par  \par Anxiety\par will renew alprazolam \par I recommend incorporating non pharmacological interventions to reduce symptoms of anxiety and thereby improve functioning such as meditation, exercise, yoga, and journaling. Side effects of benzodiazepines include impairment of psychomotor performance, amnesia, dependence, withdrawal symptoms after long-term treatment, and rebound anxiety after short-term treatment. Withdrawal and cognitive or learning impairment are more likely to occur when taking higher doses. Educated patient on importance of refraining from taking benzodiazepines with alcohol or while driving.\par added ashwagandha to regimen along with CBD and valerian root for sleep

## 2022-07-23 NOTE — HEALTH RISK ASSESSMENT
[Excellent] : ~his/her~ current health as excellent [Very Good] : ~his/her~  mood as very good [Never] : Never [No] : In the past 12 months have you used drugs other than those required for medical reasons? No [No falls in past year] : Patient reported no falls in the past year [0] : 2) Feeling down, depressed, or hopeless: Not at all (0) [PHQ-2 Negative - No further assessment needed] : PHQ-2 Negative - No further assessment needed [Patient reported mammogram was normal] : Patient reported mammogram was normal [Patient reported bone density results were normal] : Patient reported bone density results were normal [Patient reported colonoscopy was normal] : Patient reported colonoscopy was normal [HIV test declined] : HIV test declined [Hepatitis C test declined] : Hepatitis C test declined [None] : None [With Family] : lives with family [Employed] : employed [High School] : high school [] :  [# Of Children ___] : has [unfilled] children [Sexually Active] : sexually active [Feels Safe at Home] : Feels safe at home [Fully functional (bathing, dressing, toileting, transferring, walking, feeding)] : Fully functional (bathing, dressing, toileting, transferring, walking, feeding) [Fully functional (using the telephone, shopping, preparing meals, housekeeping, doing laundry, using] : Fully functional and needs no help or supervision to perform IADLs (using the telephone, shopping, preparing meals, housekeeping, doing laundry, using transportation, managing medications and managing finances) [Reports normal functional visual acuity (ie: able to read med bottle)] : Reports normal functional visual acuity [Smoke Detector] : smoke detector [Carbon Monoxide Detector] : carbon monoxide detector [Safety elements used in home] : safety elements used in home [Seat Belt] :  uses seat belt [Sunscreen] : uses sunscreen [Patient/Caregiver not ready to engage] : , patient/caregiver not ready to engage [de-identified] : nephrologist has follow up september, endocrinologist appt in september follow up, oncologist for pancreas.  [de-identified] : Walking 2 miles daily, started going to [employer] with sons gf [de-identified] : cutting out carbs and more protein,  [[id number]] : 0 [No Retinopathy] : No retinopathy [Change in mental status noted] : No change in mental status noted [Language] : denies difficulty with language [Behavior] : denies difficulty with behavior [Learning/Retaining New Information] : denies difficulty learning/retaining new information [Handling Complex Tasks] : denies difficulty handling complex tasks [Reasoning] : denies difficulty with reasoning [Spatial Ability and Orientation] : denies difficulty with spatial ability and orientation [High Risk Behavior] : no high risk behavior [Reports changes in hearing] : Reports no changes in hearing [Reports changes in vision] : Reports no changes in vision [Reports changes in dental health] : Reports no changes in dental health [MammogramDate] : 6/2022 [PapSmearDate] : 6/2022 [BoneDensityDate] : 2020 [BoneDensityComments] : rx given  [ColonoscopyDate] : 2015 [ColonoscopyComments] : scheduloed for october  [de-identified] : had cognitive testing done, something was not recited back and she will be in cognitive therapy  [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

## 2022-07-23 NOTE — HISTORY OF PRESENT ILLNESS
[FreeTextEntry1] : patient presents for CPE  [de-identified] : Presenting in office for physical examination, she is seeing oncologist for follow up MRI due to cysts on pancreas, it does not seem to be any change. She is still going to chiropractor 3x weekly for back pain but overall feels well. She has no complaints today, but is curious to get antibodies for covid. She had weaned off of Lexapro but has been having a few moments of anxiety. She is having some problems with marriage but has been in therapy herself and considering marriage counseling.

## 2022-07-25 LAB
ALBUMIN SERPL ELPH-MCNC: 4.8 G/DL
ALP BLD-CCNC: 62 U/L
ALT SERPL-CCNC: 14 U/L
ANION GAP SERPL CALC-SCNC: 9 MMOL/L
APPEARANCE: CLEAR
AST SERPL-CCNC: 20 U/L
BASOPHILS # BLD AUTO: 0.05 K/UL
BASOPHILS NFR BLD AUTO: 0.8 %
BILIRUB SERPL-MCNC: 0.5 MG/DL
BILIRUBIN URINE: NEGATIVE
BLOOD URINE: NEGATIVE
BUN SERPL-MCNC: 14 MG/DL
CALCIUM SERPL-MCNC: 9.8 MG/DL
CHLORIDE SERPL-SCNC: 103 MMOL/L
CHOLEST SERPL-MCNC: 194 MG/DL
CO2 SERPL-SCNC: 28 MMOL/L
COLOR: COLORLESS
CREAT SERPL-MCNC: 0.68 MG/DL
EGFR: 105 ML/MIN/1.73M2
EOSINOPHIL # BLD AUTO: 0.11 K/UL
EOSINOPHIL NFR BLD AUTO: 1.8 %
ESTIMATED AVERAGE GLUCOSE: 117 MG/DL
FOLATE SERPL-MCNC: 12 NG/ML
GLUCOSE QUALITATIVE U: NEGATIVE
GLUCOSE SERPL-MCNC: 99 MG/DL
HBA1C MFR BLD HPLC: 5.7 %
HCT VFR BLD CALC: 42.6 %
HDLC SERPL-MCNC: 71 MG/DL
HGB BLD-MCNC: 13.3 G/DL
IMM GRANULOCYTES NFR BLD AUTO: 0.2 %
KETONES URINE: NEGATIVE
LDLC SERPL CALC-MCNC: 107 MG/DL
LEUKOCYTE ESTERASE URINE: NEGATIVE
LYMPHOCYTES # BLD AUTO: 1.87 K/UL
LYMPHOCYTES NFR BLD AUTO: 30.3 %
MAN DIFF?: NORMAL
MCHC RBC-ENTMCNC: 31.1 PG
MCHC RBC-ENTMCNC: 31.2 GM/DL
MCV RBC AUTO: 99.8 FL
MONOCYTES # BLD AUTO: 0.56 K/UL
MONOCYTES NFR BLD AUTO: 9.1 %
NEUTROPHILS # BLD AUTO: 3.58 K/UL
NEUTROPHILS NFR BLD AUTO: 57.8 %
NITRITE URINE: NEGATIVE
NONHDLC SERPL-MCNC: 123 MG/DL
PH URINE: 8.5
PLATELET # BLD AUTO: 314 K/UL
POTASSIUM SERPL-SCNC: 4.2 MMOL/L
PROT SERPL-MCNC: 7.1 G/DL
PROTEIN URINE: NEGATIVE
RBC # BLD: 4.27 M/UL
RBC # FLD: 13.2 %
SODIUM SERPL-SCNC: 141 MMOL/L
SPECIFIC GRAVITY URINE: 1.01
T3FREE SERPL-MCNC: 3.71 PG/ML
T4 FREE SERPL-MCNC: 1.6 NG/DL
TRIGL SERPL-MCNC: 80 MG/DL
TSH SERPL-ACNC: 1.16 UIU/ML
UROBILINOGEN URINE: NORMAL
VIT B12 SERPL-MCNC: 536 PG/ML
WBC # FLD AUTO: 6.18 K/UL

## 2022-07-26 LAB
COVID-19 NUCLEOCAPSID  GAM ANTIBODY INTERPRETATION: POSITIVE
SARS-COV-2 AB SERPL QL IA: 9.26 INDEX

## 2022-08-15 DIAGNOSIS — Z78.9 OTHER SPECIFIED HEALTH STATUS: ICD-10-CM

## 2022-08-25 ENCOUNTER — APPOINTMENT (OUTPATIENT)
Dept: ENDOCRINOLOGY | Facility: CLINIC | Age: 52
End: 2022-08-25

## 2022-08-25 ENCOUNTER — APPOINTMENT (OUTPATIENT)
Dept: FAMILY MEDICINE | Facility: CLINIC | Age: 52
End: 2022-08-25

## 2022-08-25 PROCEDURE — 90707 MMR VACCINE SC: CPT

## 2022-08-25 PROCEDURE — 90471 IMMUNIZATION ADMIN: CPT

## 2022-09-08 ENCOUNTER — APPOINTMENT (OUTPATIENT)
Dept: ENDOCRINOLOGY | Facility: CLINIC | Age: 52
End: 2022-09-08

## 2022-09-08 VITALS
WEIGHT: 143 LBS | SYSTOLIC BLOOD PRESSURE: 110 MMHG | HEART RATE: 75 BPM | HEIGHT: 67 IN | BODY MASS INDEX: 22.44 KG/M2 | DIASTOLIC BLOOD PRESSURE: 70 MMHG

## 2022-09-08 PROCEDURE — 99214 OFFICE O/P EST MOD 30 MIN: CPT

## 2022-09-20 ENCOUNTER — NON-APPOINTMENT (OUTPATIENT)
Age: 52
End: 2022-09-20

## 2022-11-19 ENCOUNTER — APPOINTMENT (OUTPATIENT)
Dept: FAMILY MEDICINE | Facility: CLINIC | Age: 52
End: 2022-11-19

## 2022-11-19 VITALS
HEART RATE: 65 BPM | SYSTOLIC BLOOD PRESSURE: 108 MMHG | BODY MASS INDEX: 21.5 KG/M2 | RESPIRATION RATE: 14 BRPM | WEIGHT: 137 LBS | DIASTOLIC BLOOD PRESSURE: 70 MMHG | OXYGEN SATURATION: 98 % | HEIGHT: 67 IN

## 2022-11-19 VITALS — TEMPERATURE: 97.2 F

## 2022-11-19 DIAGNOSIS — C44.519 BASAL CELL CARCINOMA OF SKIN OF OTHER PART OF TRUNK: ICD-10-CM

## 2022-11-19 PROCEDURE — 90686 IIV4 VACC NO PRSV 0.5 ML IM: CPT

## 2022-11-19 PROCEDURE — G0008: CPT

## 2022-11-21 DIAGNOSIS — Z00.00 ENCOUNTER FOR GENERAL ADULT MEDICAL EXAMINATION W/OUT ABNORMAL FINDINGS: ICD-10-CM

## 2022-11-21 DIAGNOSIS — Z13.220 ENCOUNTER FOR SCREENING FOR LIPOID DISORDERS: ICD-10-CM

## 2022-11-21 DIAGNOSIS — Z92.29 PERSONAL HISTORY OF OTHER DRUG THERAPY: ICD-10-CM

## 2022-11-21 DIAGNOSIS — Z13.29 ENCOUNTER FOR SCREENING FOR OTHER SUSPECTED ENDOCRINE DISORDER: ICD-10-CM

## 2022-11-21 DIAGNOSIS — N20.0 CALCULUS OF KIDNEY: ICD-10-CM

## 2022-11-21 DIAGNOSIS — Z09 ENCOUNTER FOR FOLLOW-UP EXAMINATION AFTER COMPLETED TREATMENT FOR CONDITIONS OTHER THAN MALIGNANT NEOPLASM: ICD-10-CM

## 2022-11-21 DIAGNOSIS — Z13.21 ENCOUNTER FOR SCREENING FOR NUTRITIONAL DISORDER: ICD-10-CM

## 2022-11-21 DIAGNOSIS — Z13.1 ENCOUNTER FOR SCREENING FOR DIABETES MELLITUS: ICD-10-CM

## 2022-11-21 DIAGNOSIS — Z13.0 ENCOUNTER FOR SCREENING FOR DISEASES OF THE BLOOD AND BLOOD-FORMING ORGANS AND CERTAIN DISORDERS INVOLVING THE IMMUNE MECHANISM: ICD-10-CM

## 2022-11-21 DIAGNOSIS — Z11.59 ENCOUNTER FOR SCREENING FOR OTHER VIRAL DISEASES: ICD-10-CM

## 2023-04-18 DIAGNOSIS — N39.0 URINARY TRACT INFECTION, SITE NOT SPECIFIED: ICD-10-CM

## 2023-08-05 ENCOUNTER — APPOINTMENT (OUTPATIENT)
Dept: FAMILY MEDICINE | Facility: CLINIC | Age: 53
End: 2023-08-05
Payer: COMMERCIAL

## 2023-08-05 VITALS
OXYGEN SATURATION: 98 % | HEIGHT: 67 IN | HEART RATE: 67 BPM | TEMPERATURE: 98.2 F | BODY MASS INDEX: 23.07 KG/M2 | DIASTOLIC BLOOD PRESSURE: 72 MMHG | RESPIRATION RATE: 14 BRPM | WEIGHT: 147 LBS | SYSTOLIC BLOOD PRESSURE: 110 MMHG

## 2023-08-05 DIAGNOSIS — Z00.00 ENCOUNTER FOR GENERAL ADULT MEDICAL EXAMINATION W/OUT ABNORMAL FINDINGS: ICD-10-CM

## 2023-08-05 PROCEDURE — 99386 PREV VISIT NEW AGE 40-64: CPT

## 2023-08-05 RX ORDER — CIPROFLOXACIN HYDROCHLORIDE 500 MG/1
500 TABLET, FILM COATED ORAL TWICE DAILY
Qty: 14 | Refills: 0 | Status: COMPLETED | COMMUNITY
Start: 2023-04-18 | End: 2023-08-05

## 2023-08-05 RX ORDER — ALPRAZOLAM 0.5 MG/1
0.5 TABLET ORAL
Qty: 24 | Refills: 0 | Status: ACTIVE | COMMUNITY
Start: 2017-10-03 | End: 1900-01-01

## 2023-08-05 NOTE — HISTORY OF PRESENT ILLNESS
[FreeTextEntry1] : patient presents for CPE  [de-identified] : Presenting in office for physical examination, Starting in April she started having urinary tract infections regularly. She was seen by urologist and cystogrophy done and was told it could be hormonal, she was then seen by GYN and started on estradiol and completed antibiotics for UTI. She has only used the estrace cream once. She has felt better. She was taking double strength Bactrim but now has to use keflex.   Saw oncologist last week for pancreatic mass everything looks the same, she is seeing the endocrinologist September

## 2023-08-05 NOTE — HEALTH RISK ASSESSMENT
[Excellent] : ~his/her~ current health as excellent [Very Good] : ~his/her~  mood as very good [No] : In the past 12 months have you used drugs other than those required for medical reasons? No [No falls in past year] : Patient reported no falls in the past year [0] : 2) Feeling down, depressed, or hopeless: Not at all (0) [PHQ-2 Negative - No further assessment needed] : PHQ-2 Negative - No further assessment needed [No Retinopathy] : No retinopathy [Patient reported mammogram was normal] : Patient reported mammogram was normal [Patient reported bone density results were normal] : Patient reported bone density results were normal [Patient reported colonoscopy was normal] : Patient reported colonoscopy was normal [HIV test declined] : HIV test declined [Hepatitis C test declined] : Hepatitis C test declined [None] : None [With Family] : lives with family [Employed] : employed [High School] : high school [] :  [# Of Children ___] : has [unfilled] children [Sexually Active] : sexually active [Feels Safe at Home] : Feels safe at home [Fully functional (bathing, dressing, toileting, transferring, walking, feeding)] : Fully functional (bathing, dressing, toileting, transferring, walking, feeding) [Fully functional (using the telephone, shopping, preparing meals, housekeeping, doing laundry, using] : Fully functional and needs no help or supervision to perform IADLs (using the telephone, shopping, preparing meals, housekeeping, doing laundry, using transportation, managing medications and managing finances) [Reports normal functional visual acuity (ie: able to read med bottle)] : Reports normal functional visual acuity [Smoke Detector] : smoke detector [Carbon Monoxide Detector] : carbon monoxide detector [Safety elements used in home] : safety elements used in home [Seat Belt] :  uses seat belt [Sunscreen] : uses sunscreen [Patient/Caregiver not ready to engage] : , patient/caregiver not ready to engage [Never] : Never [Intercurrent Urgi Care visits] : went to urgent care [de-identified] : Frequent UTI's [de-identified] : Endocrinologist appt in september follow up, oncologist for pancreas. urologist for frequent UTI [de-identified] : Walking 2 miles daily, started going to [employer] with sons gf [de-identified] : cutting out carbs and sugar and more protein, more chicken and veggies, [[id number]] : 0 [Change in mental status noted] : No change in mental status noted [Language] : denies difficulty with language [Behavior] : denies difficulty with behavior [Learning/Retaining New Information] : denies difficulty learning/retaining new information [Handling Complex Tasks] : denies difficulty handling complex tasks [Reasoning] : denies difficulty with reasoning [Spatial Ability and Orientation] : denies difficulty with spatial ability and orientation [High Risk Behavior] : no high risk behavior [Reports changes in hearing] : Reports no changes in hearing [Reports changes in vision] : Reports no changes in vision [Reports changes in dental health] : Reports no changes in dental health [MammogramDate] : 2023 [BoneDensityDate] : 2022 [PapSmearDate] : 2023 [ColonoscopyDate] : 10/2022 [de-identified] : had cognitive testing done, something was not recited back and she will be in cognitive therapy  [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

## 2023-08-05 NOTE — PLAN
[FreeTextEntry1] : CPE will order routine lab work to be done in office and [facility]  Reviewed endocrinology results due for repeat thyroid us- has appt with Dr. Mclean in office for thyroid scan   Anxiety will renew alprazolam  I recommend incorporating non pharmacological interventions to reduce symptoms of anxiety and thereby improve functioning such as meditation, exercise, yoga, and journaling. Side effects of benzodiazepines include impairment of psychomotor performance, amnesia, dependence, withdrawal symptoms after long-term treatment, and rebound anxiety after short-term treatment. Withdrawal and cognitive or learning impairment are more likely to occur when taking higher doses. Educated patient on importance of refraining from taking benzodiazepines with alcohol or while driving. added ashwagandha to regimen along with CBD and valerian root for sleep

## 2023-08-05 NOTE — PHYSICAL EXAM
[No Acute Distress] : no acute distress [Well Nourished] : well nourished [Well Developed] : well developed [Well-Appearing] : well-appearing [Normal Sclera/Conjunctiva] : normal sclera/conjunctiva [EOMI] : extraocular movements intact [PERRL] : pupils equal round and reactive to light [Normal Outer Ear/Nose] : the outer ears and nose were normal in appearance [Normal Oropharynx] : the oropharynx was normal [No JVD] : no jugular venous distention [No Lymphadenopathy] : no lymphadenopathy [Supple] : supple [Thyroid Normal, No Nodules] : the thyroid was normal and there were no nodules present [No Respiratory Distress] : no respiratory distress  [No Accessory Muscle Use] : no accessory muscle use [Clear to Auscultation] : lungs were clear to auscultation bilaterally [Regular Rhythm] : with a regular rhythm [Normal Rate] : normal rate  [Normal S1, S2] : normal S1 and S2 [No Murmur] : no murmur heard [No Carotid Bruits] : no carotid bruits [No Abdominal Bruit] : a ~M bruit was not heard ~T in the abdomen [No Varicosities] : no varicosities [Pedal Pulses Present] : the pedal pulses are present [No Edema] : there was no peripheral edema [No Palpable Aorta] : no palpable aorta [No Extremity Clubbing/Cyanosis] : no extremity clubbing/cyanosis [Soft] : abdomen soft [Non Tender] : non-tender [Non-distended] : non-distended [No Masses] : no abdominal mass palpated [No HSM] : no HSM [Normal Bowel Sounds] : normal bowel sounds [No CVA Tenderness] : no CVA  tenderness [No Spinal Tenderness] : no spinal tenderness [No Joint Swelling] : no joint swelling [Grossly Normal Strength/Tone] : grossly normal strength/tone [No Rash] : no rash [Coordination Grossly Intact] : coordination grossly intact [No Focal Deficits] : no focal deficits [Normal Gait] : normal gait [Deep Tendon Reflexes (DTR)] : deep tendon reflexes were 2+ and symmetric [Normal Affect] : the affect was normal [Normal Insight/Judgement] : insight and judgment were intact

## 2023-08-10 RX ORDER — CIPROFLOXACIN HYDROCHLORIDE 500 MG/1
500 TABLET, FILM COATED ORAL TWICE DAILY
Qty: 14 | Refills: 0 | Status: ACTIVE | COMMUNITY
Start: 2023-08-10 | End: 1900-01-01

## 2023-08-10 RX ORDER — SULFAMETHOXAZOLE AND TRIMETHOPRIM 800; 160 MG/1; MG/1
800-160 TABLET ORAL TWICE DAILY
Qty: 20 | Refills: 0 | Status: ACTIVE | COMMUNITY
Start: 2022-06-04 | End: 1900-01-01

## 2023-09-06 LAB — TSH SERPL-ACNC: 0.86

## 2023-09-07 ENCOUNTER — APPOINTMENT (OUTPATIENT)
Dept: ENDOCRINOLOGY | Facility: CLINIC | Age: 53
End: 2023-09-07
Payer: COMMERCIAL

## 2023-09-07 VITALS
WEIGHT: 150 LBS | BODY MASS INDEX: 23.54 KG/M2 | HEART RATE: 85 BPM | DIASTOLIC BLOOD PRESSURE: 60 MMHG | HEIGHT: 67 IN | SYSTOLIC BLOOD PRESSURE: 108 MMHG | OXYGEN SATURATION: 95 %

## 2023-09-07 DIAGNOSIS — E27.8 OTHER SPECIFIED DISORDERS OF ADRENAL GLAND: ICD-10-CM

## 2023-09-07 DIAGNOSIS — E55.9 VITAMIN D DEFICIENCY, UNSPECIFIED: ICD-10-CM

## 2023-09-07 DIAGNOSIS — E04.2 NONTOXIC MULTINODULAR GOITER: ICD-10-CM

## 2023-09-07 DIAGNOSIS — M85.80 OTHER SPECIFIED DISORDERS OF BONE DENSITY AND STRUCTURE, UNSPECIFIED SITE: ICD-10-CM

## 2023-09-07 PROCEDURE — 99214 OFFICE O/P EST MOD 30 MIN: CPT

## 2023-09-07 NOTE — REASON FOR VISIT
[Follow - Up] : a follow-up visit [Adrenal Evaluation/Adrenal Disorder] : adrenal evaluation/adrenal disorder [Thyroid nodule/ MNG] : thyroid nodule/ MNG

## 2023-09-07 NOTE — PHYSICAL EXAM
[Alert] : alert [Well Nourished] : well nourished [No Acute Distress] : no acute distress [Well Developed] : well developed [Normal Sclera/Conjunctiva] : normal sclera/conjunctiva [EOMI] : extra ocular movement intact [No Proptosis] : no proptosis [Normal Oropharynx] : the oropharynx was normal [Thyroid Not Enlarged] : the thyroid was not enlarged [No Thyroid Nodules] : no palpable thyroid nodules [No Respiratory Distress] : no respiratory distress [No Accessory Muscle Use] : no accessory muscle use [Clear to Auscultation] : lungs were clear to auscultation bilaterally [Normal S1, S2] : normal S1 and S2 [Normal Rate] : heart rate was normal [Regular Rhythm] : with a regular rhythm [Normal Bowel Sounds] : normal bowel sounds [Not Tender] : non-tender [Not Distended] : not distended [Soft] : abdomen soft [No Stigmata of Cushings Syndrome] : no stigmata of Cushings Syndrome [Normal Gait] : normal gait [Normal Strength/Tone] : muscle strength and tone were normal [No Rash] : no rash [Normal Reflexes] : deep tendon reflexes were 2+ and symmetric [No Tremors] : no tremors [Oriented x3] : oriented to person, place, and time [Normal Affect] : the affect was normal [Normal Mood] : the mood was normal [Acanthosis Nigricans] : no acanthosis nigricans
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
## 2023-10-09 DIAGNOSIS — R73.03 PREDIABETES.: ICD-10-CM

## 2024-04-18 DIAGNOSIS — Z78.9 OTHER SPECIFIED HEALTH STATUS: ICD-10-CM

## 2024-05-09 ENCOUNTER — RX RENEWAL (OUTPATIENT)
Age: 54
End: 2024-05-09

## 2024-05-10 RX ORDER — ESCITALOPRAM OXALATE 10 MG/1
10 TABLET ORAL DAILY
Qty: 90 | Refills: 3 | Status: ACTIVE | COMMUNITY
Start: 1900-01-01 | End: 1900-01-01

## 2024-08-24 ENCOUNTER — APPOINTMENT (OUTPATIENT)
Dept: FAMILY MEDICINE | Facility: CLINIC | Age: 54
End: 2024-08-24
Payer: COMMERCIAL

## 2024-08-24 VITALS
BODY MASS INDEX: 23.54 KG/M2 | OXYGEN SATURATION: 97 % | SYSTOLIC BLOOD PRESSURE: 108 MMHG | DIASTOLIC BLOOD PRESSURE: 70 MMHG | HEART RATE: 106 BPM | HEIGHT: 67 IN | RESPIRATION RATE: 14 BRPM | WEIGHT: 150 LBS

## 2024-08-24 DIAGNOSIS — Z00.00 ENCOUNTER FOR GENERAL ADULT MEDICAL EXAMINATION W/OUT ABNORMAL FINDINGS: ICD-10-CM

## 2024-08-24 DIAGNOSIS — Z13.820 ENCOUNTER FOR SCREENING FOR OSTEOPOROSIS: ICD-10-CM

## 2024-08-24 LAB — CYTOLOGY CVX/VAG DOC THIN PREP: NORMAL

## 2024-08-24 PROCEDURE — 99396 PREV VISIT EST AGE 40-64: CPT

## 2024-08-24 NOTE — PLAN
[FreeTextEntry1] : CPE will order routine lab work to be done in office and [facility] at [facility]   Anxiety will renew alprazolam  I recommend incorporating non pharmacological interventions to reduce symptoms of anxiety and thereby improve functioning such as meditation, exercise, yoga, and journaling. Side effects of benzodiazepines include impairment of psychomotor performance, amnesia, dependence, withdrawal symptoms after long-term treatment, and rebound anxiety after short-term treatment. Withdrawal and cognitive or learning impairment are more likely to occur when taking higher doses. Educated patient on importance of refraining from taking benzodiazepines with alcohol or while driving. added ashwagandha to regimen along with CBD and valerian root for sleep

## 2024-08-24 NOTE — HEALTH RISK ASSESSMENT
[Excellent] : ~his/her~ current health as excellent [Very Good] : ~his/her~  mood as very good [Intercurrent Urgi Care visits] : went to urgent care [No] : In the past 12 months have you used drugs other than those required for medical reasons? No [No falls in past year] : Patient reported no falls in the past year [0] : 2) Feeling down, depressed, or hopeless: Not at all (0) [PHQ-2 Negative - No further assessment needed] : PHQ-2 Negative - No further assessment needed [Never] : Never [No Retinopathy] : No retinopathy [Patient reported mammogram was normal] : Patient reported mammogram was normal [Patient reported bone density results were normal] : Patient reported bone density results were normal [Patient reported colonoscopy was normal] : Patient reported colonoscopy was normal [HIV test declined] : HIV test declined [Hepatitis C test declined] : Hepatitis C test declined [None] : None [With Family] : lives with family [Employed] : employed [High School] : high school [] :  [# Of Children ___] : has [unfilled] children [Sexually Active] : sexually active [Feels Safe at Home] : Feels safe at home [Fully functional (bathing, dressing, toileting, transferring, walking, feeding)] : Fully functional (bathing, dressing, toileting, transferring, walking, feeding) [Fully functional (using the telephone, shopping, preparing meals, housekeeping, doing laundry, using] : Fully functional and needs no help or supervision to perform IADLs (using the telephone, shopping, preparing meals, housekeeping, doing laundry, using transportation, managing medications and managing finances) [Reports normal functional visual acuity (ie: able to read med bottle)] : Reports normal functional visual acuity [Smoke Detector] : smoke detector [Carbon Monoxide Detector] : carbon monoxide detector [Safety elements used in home] : safety elements used in home [Seat Belt] :  uses seat belt [Sunscreen] : uses sunscreen [Patient/Caregiver not ready to engage] : , patient/caregiver not ready to engage [de-identified] : denies  [de-identified] : walking in the morning, 2 miles in the morning 2 in the vening, tries to do some weight training  [de-identified] : does not eat bread, cheese, eggs chicken salmon fish and veggies, boring diet  [[id number]] : 0 [Change in mental status noted] : No change in mental status noted [Language] : denies difficulty with language [Behavior] : denies difficulty with behavior [Learning/Retaining New Information] : denies difficulty learning/retaining new information [Handling Complex Tasks] : denies difficulty handling complex tasks [Reasoning] : denies difficulty with reasoning [Spatial Ability and Orientation] : denies difficulty with spatial ability and orientation [High Risk Behavior] : no high risk behavior [Reports changes in hearing] : Reports no changes in hearing [Reports changes in vision] : Reports no changes in vision [Reports changes in dental health] : Reports no changes in dental health [MammogramDate] : 7/2024 [PapSmearDate] : 2024 [BoneDensityDate] : 2022 [ColonoscopyDate] : 10/2022 [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

## 2024-08-24 NOTE — HISTORY OF PRESENT ILLNESS
[FreeTextEntry1] :  Patient presents for CPE [de-identified] : Presenting in office for CPE , she has no complaints   Son got engaged in august

## 2024-08-24 NOTE — HISTORY OF PRESENT ILLNESS
[FreeTextEntry1] :  Patient presents for CPE [de-identified] : Presenting in office for CPE , she has no complaints   Son got engaged in august

## 2024-08-24 NOTE — HEALTH RISK ASSESSMENT
[Excellent] : ~his/her~ current health as excellent [Very Good] : ~his/her~  mood as very good [Intercurrent Urgi Care visits] : went to urgent care [No] : In the past 12 months have you used drugs other than those required for medical reasons? No [No falls in past year] : Patient reported no falls in the past year [0] : 2) Feeling down, depressed, or hopeless: Not at all (0) [PHQ-2 Negative - No further assessment needed] : PHQ-2 Negative - No further assessment needed [Never] : Never [No Retinopathy] : No retinopathy [Patient reported mammogram was normal] : Patient reported mammogram was normal [Patient reported bone density results were normal] : Patient reported bone density results were normal [Patient reported colonoscopy was normal] : Patient reported colonoscopy was normal [HIV test declined] : HIV test declined [Hepatitis C test declined] : Hepatitis C test declined [None] : None [With Family] : lives with family [Employed] : employed [High School] : high school [] :  [# Of Children ___] : has [unfilled] children [Sexually Active] : sexually active [Feels Safe at Home] : Feels safe at home [Fully functional (bathing, dressing, toileting, transferring, walking, feeding)] : Fully functional (bathing, dressing, toileting, transferring, walking, feeding) [Fully functional (using the telephone, shopping, preparing meals, housekeeping, doing laundry, using] : Fully functional and needs no help or supervision to perform IADLs (using the telephone, shopping, preparing meals, housekeeping, doing laundry, using transportation, managing medications and managing finances) [Reports normal functional visual acuity (ie: able to read med bottle)] : Reports normal functional visual acuity [Smoke Detector] : smoke detector [Carbon Monoxide Detector] : carbon monoxide detector [Safety elements used in home] : safety elements used in home [Seat Belt] :  uses seat belt [Sunscreen] : uses sunscreen [Patient/Caregiver not ready to engage] : , patient/caregiver not ready to engage [de-identified] : denies  [de-identified] : walking in the morning, 2 miles in the morning 2 in the vening, tries to do some weight training  [de-identified] : does not eat bread, cheese, eggs chicken salmon fish and veggies, boring diet  [[id number]] : 0 [Change in mental status noted] : No change in mental status noted [Language] : denies difficulty with language [Behavior] : denies difficulty with behavior [Learning/Retaining New Information] : denies difficulty learning/retaining new information [Handling Complex Tasks] : denies difficulty handling complex tasks [Reasoning] : denies difficulty with reasoning [Spatial Ability and Orientation] : denies difficulty with spatial ability and orientation [High Risk Behavior] : no high risk behavior [Reports changes in hearing] : Reports no changes in hearing [Reports changes in vision] : Reports no changes in vision [Reports changes in dental health] : Reports no changes in dental health [MammogramDate] : 7/2024 [PapSmearDate] : 2024 [BoneDensityDate] : 2022 [ColonoscopyDate] : 10/2022 [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

## 2024-10-17 ENCOUNTER — APPOINTMENT (OUTPATIENT)
Dept: ORTHOPEDIC SURGERY | Facility: CLINIC | Age: 54
End: 2024-10-17

## 2024-11-14 ENCOUNTER — APPOINTMENT (OUTPATIENT)
Dept: ORTHOPEDIC SURGERY | Facility: CLINIC | Age: 54
End: 2024-11-14
Payer: COMMERCIAL

## 2024-11-14 DIAGNOSIS — M94.262 CHONDROMALACIA, LEFT KNEE: ICD-10-CM

## 2024-11-14 DIAGNOSIS — G89.29 PAIN IN LEFT KNEE: ICD-10-CM

## 2024-11-14 DIAGNOSIS — M25.562 PAIN IN LEFT KNEE: ICD-10-CM

## 2024-11-14 PROCEDURE — 99203 OFFICE O/P NEW LOW 30 MIN: CPT

## 2025-02-14 ENCOUNTER — APPOINTMENT (OUTPATIENT)
Dept: FAMILY MEDICINE | Facility: CLINIC | Age: 55
End: 2025-02-14
Payer: COMMERCIAL

## 2025-02-14 VITALS
RESPIRATION RATE: 16 BRPM | HEIGHT: 67 IN | WEIGHT: 155 LBS | BODY MASS INDEX: 24.33 KG/M2 | SYSTOLIC BLOOD PRESSURE: 102 MMHG | HEART RATE: 71 BPM | OXYGEN SATURATION: 99 % | TEMPERATURE: 97.2 F | DIASTOLIC BLOOD PRESSURE: 80 MMHG

## 2025-02-14 DIAGNOSIS — R68.89 OTHER GENERAL SYMPTOMS AND SIGNS: ICD-10-CM

## 2025-02-14 PROCEDURE — G2211 COMPLEX E/M VISIT ADD ON: CPT | Mod: NC

## 2025-02-14 PROCEDURE — 99213 OFFICE O/P EST LOW 20 MIN: CPT
